# Patient Record
Sex: FEMALE | Race: WHITE
[De-identification: names, ages, dates, MRNs, and addresses within clinical notes are randomized per-mention and may not be internally consistent; named-entity substitution may affect disease eponyms.]

---

## 2018-02-28 ENCOUNTER — HOSPITAL ENCOUNTER (OUTPATIENT)
Dept: HOSPITAL 89 - OR | Age: 58
Discharge: HOME | End: 2018-02-28
Attending: SURGERY
Payer: COMMERCIAL

## 2018-02-28 VITALS — SYSTOLIC BLOOD PRESSURE: 95 MMHG | DIASTOLIC BLOOD PRESSURE: 57 MMHG

## 2018-02-28 VITALS — SYSTOLIC BLOOD PRESSURE: 97 MMHG | DIASTOLIC BLOOD PRESSURE: 81 MMHG

## 2018-02-28 VITALS — DIASTOLIC BLOOD PRESSURE: 79 MMHG | SYSTOLIC BLOOD PRESSURE: 108 MMHG

## 2018-02-28 VITALS — DIASTOLIC BLOOD PRESSURE: 74 MMHG | SYSTOLIC BLOOD PRESSURE: 105 MMHG

## 2018-02-28 VITALS — BODY MASS INDEX: 23.03 KG/M2 | WEIGHT: 145 LBS | HEIGHT: 66.5 IN

## 2018-02-28 VITALS — DIASTOLIC BLOOD PRESSURE: 84 MMHG | SYSTOLIC BLOOD PRESSURE: 129 MMHG

## 2018-02-28 VITALS — SYSTOLIC BLOOD PRESSURE: 95 MMHG | DIASTOLIC BLOOD PRESSURE: 67 MMHG

## 2018-02-28 DIAGNOSIS — Z86.010: ICD-10-CM

## 2018-02-28 DIAGNOSIS — Z12.11: Primary | ICD-10-CM

## 2018-02-28 PROCEDURE — 00812 ANES LWR INTST SCR COLSC: CPT

## 2018-02-28 PROCEDURE — 45378 DIAGNOSTIC COLONOSCOPY: CPT

## 2018-02-28 NOTE — SHORT(OUTPT) DISCHARGE SUMMARY
Discharge Summary


Reason for Hosp/Final Diag:  


(1) History of colon polyps


Status:  Chronic


Hospital Course & Plan:  Colonoscopy completed without problems.





Departure


Discharge to:  Home, Self Care





Discharge Instructions


Home Meds


Active Scripts


Peg/Electrolytes (GOLYTELY SOLUTION) 4,000 Ml Soln, 1 GAL PO ONCE, #1 GAL 0 

Refills


   Prov:ULLRICH,JOHN A MD         2/26/18


Reported Medications


Naproxen Sodium (ALEVE) 220 Mg Tablet, 220 MG PO PRN, TAB


   2/21/18


Discontinued Reported Medications


Acetaminophen (TYLENOL) 325 Mg Tablet, 325 MG PO PRN, TAB


   3/31/17


Naproxen Sodium (ALEVE) 220 Mg Capsule, 220 MG PO PRN, CAPSULE


   3/31/17


Diet:  Regular


Activity:  As Tolerated


Special Instructions:  


Your colonoscopy was completed without any problems and your prep was


excellent (Good Job!!).  I didn't find any polyps, cancers, or other


abnormalities.  Your colon looked great!  I recommend that you have


another colonoscopy in 5 years due to your previous history of a colon


polyp.











ULLRICH,JOHN A MD Feb 28, 2018 07:51

## 2018-11-16 ENCOUNTER — HOSPITAL ENCOUNTER (OUTPATIENT)
Dept: HOSPITAL 89 - MAMO | Age: 58
End: 2018-11-16
Attending: NURSE PRACTITIONER
Payer: COMMERCIAL

## 2018-11-16 DIAGNOSIS — Z12.31: Primary | ICD-10-CM

## 2018-11-16 PROCEDURE — 77067 SCR MAMMO BI INCL CAD: CPT

## 2018-11-16 PROCEDURE — 77063 BREAST TOMOSYNTHESIS BI: CPT

## 2018-11-16 NOTE — RADIOLOGY IMAGING REPORT
FACILITY: [facility] - [place], [place] 

 

PATIENT NAME: AZIZA CRUZ

: [id number]

MR: [id number]

V: [id number]

EXAM DATE: [id number]

ORDERING PHYSICIAN: JET VASQUEZ

TECHNOLOGIST: Carolina Webb

 

PROCEDURE:BILATERAL DIGITAL SCREENING MAMMOGRAM WITH CAD ASSISTED 

INTERPRETATION & 3D TOMOSYNTHESIS 

 

COMPARISON:Prior mammograms 11/15/2017, 2016.

 

INDICATIONS:SCREENING, family history of breast carcinoma in maternal 

and paternal grandmothers.

 

TISSUE DENSITY: Heterogeneously dense, breast tissue can obscure small 

masses.

 

FINDINGS:  

There is no mammographic finding suspicious for malignancy, no 

significant change compared to prior mammograms.

 

DIAGNOSTIC CATEGORY 1--NEGATIVE.  

 

RECOMMENDATIONS:

ANNUAL BILATERAL SCREENING MAMMOGRAM AND CLINICAL EVALUATION.   

 

IMPRESSION: 

BIRADS 1: Negative.

 

 

 

 

 

 

Dictated by:  Yaneli Gallego M.D. on 2018 at 15:37   

Transcribed by: FIX on 2018 at 15:47    

Approved by:  Yaneli Gallego M.D. on 2018 at 15:59   

[facility]